# Patient Record
Sex: MALE | Race: WHITE | URBAN - METROPOLITAN AREA
[De-identification: names, ages, dates, MRNs, and addresses within clinical notes are randomized per-mention and may not be internally consistent; named-entity substitution may affect disease eponyms.]

---

## 2024-08-15 ENCOUNTER — OFFICE VISIT (OUTPATIENT)
Dept: RHEUMATOLOGY | Facility: CLINIC | Age: 49
End: 2024-08-15
Payer: COMMERCIAL

## 2024-08-15 VITALS
HEIGHT: 68 IN | TEMPERATURE: 98 F | OXYGEN SATURATION: 97 % | SYSTOLIC BLOOD PRESSURE: 118 MMHG | BODY MASS INDEX: 31.52 KG/M2 | WEIGHT: 208 LBS | HEART RATE: 66 BPM | RESPIRATION RATE: 16 BRPM | DIASTOLIC BLOOD PRESSURE: 78 MMHG

## 2024-08-15 DIAGNOSIS — D84.821 IMMUNODEFICIENCY DUE TO DRUG THERAPY (HCC): ICD-10-CM

## 2024-08-15 DIAGNOSIS — M35.3 PMR (POLYMYALGIA RHEUMATICA) (HCC): Primary | ICD-10-CM

## 2024-08-15 DIAGNOSIS — Z79.52 LONG TERM CURRENT USE OF SYSTEMIC STEROIDS: ICD-10-CM

## 2024-08-15 DIAGNOSIS — Z79.899 IMMUNODEFICIENCY DUE TO DRUG THERAPY (HCC): ICD-10-CM

## 2024-08-15 PROBLEM — E66.9 OBESITY DUE TO ENERGY IMBALANCE: Status: ACTIVE | Noted: 2021-03-15

## 2024-08-15 PROBLEM — K64.8 INTERNAL HEMORRHOIDS: Status: ACTIVE | Noted: 2020-12-29

## 2024-08-15 PROBLEM — M20.21 HALLUX RIGIDUS OF BOTH FEET: Status: ACTIVE | Noted: 2021-03-15

## 2024-08-15 PROBLEM — M20.22 HALLUX RIGIDUS OF BOTH FEET: Status: ACTIVE | Noted: 2021-03-15

## 2024-08-15 PROBLEM — K57.20 DIVERTICULITIS OF LARGE INTESTINE WITH ABSCESS WITHOUT BLEEDING: Status: ACTIVE | Noted: 2023-01-11

## 2024-08-15 PROBLEM — M79.643 PAIN IN UNSPECIFIED HAND: Status: ACTIVE | Noted: 2023-11-03

## 2024-08-15 PROBLEM — M25.879: Status: ACTIVE | Noted: 2021-03-15

## 2024-08-15 RX ORDER — LEFLUNOMIDE 20 MG/1
TABLET ORAL
Qty: 83 TABLET | Refills: 0 | Status: SHIPPED | OUTPATIENT
Start: 2024-08-15 | End: 2024-11-13

## 2024-08-15 NOTE — PROGRESS NOTES
Rheumatology New Patient Note  =====================================================================================================    Date of visit: 8/15/2024  ?  Chief complaint: PMR    Chief Complaint   Patient presents with    [facility]     New pt. Has seen a rheum for about a year and just wants to confirm diagnosis and treatment plan. Diagnosed with PMR. Tried methotrexate and had issues with liver. Trying to lower pred dosage and getting more joint pain. No headaches or blurry. Converted rapid score of 6.3     Referring (will send letter)  PCP  Anton Dennis MD  Fax: [phone]  Phone: [phone]  =====================================================================================================  HPI    Tom Farnsworth is a 49 year old male     Here for further evaluation and a second opinion of polymyalgia rheumatica.  Currently seeing Dr. Felipe Urbina numerous prescriber.  Hyperacute onset of bilateral shoulder weakness and stiffness in fall 2023.  Felt very weak.  Cannot raise his hands or stand up from a seated position.  Saw multiple orthopedic providers without clear diagnosis outside of osteoarthritis.    [facility] treated with prednisone 20 mg daily with significant improvement. Went back to normal self. Could not taper off prednisone, started on methotrexate for steroid sparing.  Lowest dose of prednisone achieved with 7 mg daily.  But developed slight elevation in ALT 39. When attempting to taper off prednisone, symptoms started to reoccur. Ok at 10 mg daily. But more symptoms on prednisone 7 mg daily.  Works in construction. S/p shoulder cortisone injection.   -Continues to have significant AM stiffness, for several hours.    No giant cell arteritis symptoms: Denies headaches, tenderness jaw claudication, vision changes (diplopia, amaurosis fugax symptoms, visual field changes), fevers, chills, night sweats.      14 point ROS negative except noted  above    Medications:  No current outpatient medications on file prior to visit.     No current facility-administered medications on file prior to visit.       Past Medical History:  Past Medical History:    History of diverticulitis    PMR (polymyalgia rheumatica) (HCC)     Past Surgical History:  Past Surgical History:   Procedure Laterality Date    Other surgical history Left 1995    shoulder surgery    Other surgical history      knee surgery     Family History:  History reviewed. No pertinent family history.  Social History:  Social History     Tobacco Use    Smoking status: Never    Smokeless tobacco: Never   Substance and Sexual Activity    Alcohol use: Yes     Comment: very rarely    Drug use: Never     ?  Allergies:  No Known Allergies      Objective    Vitals:    08/15/24 1306   BP: 118/78   Pulse: 66   Resp: 16   Temp: 98.2 °F (36.8 °C)   SpO2: 97%   Weight: 208 lb (94.3 kg)   Height: 5' 8\" (1.727 m)       GEN: NAD, well-nourished.   HEENT: Head: NCAT. Face: No lesions. Eyes: Conjunctiva clear. Sclera are anicteric. PERRLA. EOMs are full. Ears: The right and left ear canals are clear.  Nose: No external or internal nasal deformities. Nasal septum is midline. Mouth: The lips are within normal limits.  No oral ulcers Tongue is midline with no lesions. The oral cavity is clear.  -No temporal artery tenderness    Neck: Supple. No neck masses. No thyromegaly. No LAD, parotid or submandicular gland palpated.   CV: RRR, no mrg, S1/S2  PULM: CTAB, no wrr, easy effort  Extremities: No cyanosis, edema or deformities.   Neurologic: Strength, CN2-12 grossly intact   Psych: normal affect.   Skin: No lesions or rashes.  MSK: 28 joint count performed. No evidence of synovitis in mcp, pip, dip, wrist, elbows, shoulders, hips, knees, ankles, mtp unless otherwise noted. Full ROM of elbows, wrists, knees.     No painful arc  -Able to stand from seated position without use of hands    Labs:    6/20/2024  Creat 0.76, rest of  comprehensive metabolic panel (CMP) wnl  Esr 28  Complete blood count with differential (CBC w/ diff) wnl     2/2024  HBsAg, HBcAB total negative, HCV neg, IGRA neg  BETH, RF, CCP neg  Uric acid 4.2    10/2023  Sed rate 55      No results found for: \"WBC\", \"RBC\", \"HGB\", \"HCT\", \"PLT\", \"MPV\", \"MCV\", \"MCH\", \"MCHC\", \"RDW\", \"NEPRELIM\", \"NEUTABS\", \"LYMPHABS\", \"EOSABS\", \"BASABS\", \"NEUT\", \"LYMPH\", \"MON\", \"EOS\", \"BASO\", \"NEPERCENT\", \"LYPERCENT\", \"MOPERCENT\", \"EOPERCENT\", \"BAPERCENT\", \"NE\", \"LYMABS\", \"MOABSO\", \"EOABSO\", \"BAABSO\"  No results found for: \"GLU\", \"BUN\", \"BUNCREA\", \"CREATSERUM\", \"ANIONGAP\", \"GFR\", \"GFRNAA\", \"GFRAA\", \"CA\", \"OSMOCALC\", \"ALKPHO\", \"AST\", \"ALT\", \"ALKPHOS\", \"BILT\", \"TP\", \"ALB\", \"GLOBULIN\", \"AGRATIO\", \"NA\", \"K\", \"CL\", \"CO2\"      No results found for: \"ANATI\", \"BETH\", \"ANAS\", \"ANASCRN\", \"ANASCRNRFLX\", \"KIMANI\"  No results found for: \"SSA\", \"SSAUR\", \"ANTISSA\", \"SSA52\", \"SSA60\", \"SSADD\", \"SSB\", \"ANTISSB\"  No results found for: \"DSDNA\", \"ANTIDSDNA\", \"SMUD\", \"ANTISM\", \"SM\", \"RNP\", \"ANTIRNP\", \"SMITHRNP\"  No results found for: \"SCL70\", \"SCL\", \"JFTPSIY42\"  No results found for: \"C3\", \"C4\"  No results found for: \"DRVVT\", \"LAINT\", \"PTTLUPUS\", \"LUPUSINTERP\", \"LA\", \"K7UN9NWBEP\", \"E3XD5VZIMC\", \"Q8MTKCLWQY\", \"C0EEBXEIQV\"  No results found for: \"CARDIOLIPIGG\", \"CARDIOLIPIGM\", \"CARDIOLIPIGA\", \"CARDIOIGA\", \"CARLIP\"      Additional Labs:    Radiology:    Radiology review:      =====================================================================================================  Assessment and Plan    Assessment:  1. PMR (polymyalgia rheumatica) ([facility])    2. 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 Long term current use of systemic steroids      Agree with previous diagnosis of polymyalgia rheumatica.  Patient developed hyperacute onset of shoulder/hip girdle symptoms with an elevated sed rate of 55 (no CRP completed at that time).  Profound resolution of symptoms with moderate doses of prednisone with recurrence of  symptoms with tapering prednisone.  Currently at 7 mg daily and he is tapering by 1 mg every 2 weeks.  -Denies any symptoms current or past consistent with cranial GCA  -ALT elevation on methotrexate.  Patient declines restarting methotrexate  -Today patient is doing quite well.  In low disease activity currently in the early afternoon, though he does have prominent a.m. stiffness.    #Chronic steroid use:    High risk medication labs including CMP and CBC w/ diff reviewed from 6/2024. Results are stable.   -HBsAg, HBcAB total negative, HCV neg, IGRA neg in 2/2024    ?  Plan:  -Obtain additional monitoring blood work, repeat inflammatory markers given PMR.  Obtain muscle biomarkers including CK/LDH to evaluate for other systemic autoimmune disease such as myositis.  Also add on a myositis panel.  Add on a SPEP  -For steroid sparing, trial of leflunomide 10 mg daily for 2 weeks, then increase to 20 mg daily if tolerating  -Patient with some worsening symptoms over time with decreasing prednisone at the current rate, try decreasing little bit slower, by 1 mg every 3 weeks  -DEXA given chronic steroid use    Continue to follow-up with primary rheumatologist, Dr. Felipe Urbina    ARAVA/Leflunomide:  Risks of arava include and not limited to infections,  hairloss, Gastrointestinal, liver, hematological (blood) and pulmonary toxicity.  The most common side effect of leflunomide is diarrhea, which occurs in approximately 20% of patients. This symptom frequently improves with time or medications given to prevent diarrhea.   arava will stay in hepatobiliary system and will need to hold for 2 years prior to conception given teratogenicity.   Less common side effects include nausea, stomach pain, indigestion, rash, or hair loss. In fewer than 10% of patients, leflunomide can cause abnormal liver function tests or decreased blood cell or platelet counts. Rarely, this drug may cause lung problems such as cough, shortness of  breath or lung injury.There is also an increase risk of teratogenicity (EXTREMELY harmful to developing fetus). If patient wishes to have children, it is advised that the medication be discontinued at least 6 months prior to conception.    Due to liver toxicity, recommend to avoid alcohol while taking this medication.      If the patient has an infection, arava should be stopped and restarted after the infection has resolved. This should be done in consultation with the treating physician.       It is also advised to stop the medication prior to surgical procedures. This should also be discussed and coordinated with the treating provider and surgical team.     Laboratory work (blood work) to evaluate kidneys, blood counts and liver will be obtain every 3-4 weeks while the medication dose is increased.  After the medication reaches a stable dose, the frequency of lab monitoring can be decreased if there are no side effects.   The medication may take up 3 months to take full effect.      Diagnoses and all orders for this visit:    PMR (polymyalgia rheumatica) ([facility])  -     XR DEXA BONE DENSITOMETRY (CPT=77080); Future  -     CK (Creatine Kinase) (Not Creatinine); Future  -     LDH; Future  -     Monoclonal Protein Study; Future  -     Sed Rate, Westergren (Automated); Future  -     C-Reactive Protein; Future  -     Myositis Antibody Comprehensive Panel; Future  -     leflunomide 20 MG Oral Tab; Take 0.5 tablets (10 mg total) by mouth daily for 14 days, THEN 1 tablet (20 mg total) daily.    Immunodeficiency due to drug therapy ([facility])  -     XR DEXA BONE DENSITOMETRY (CPT=77080); Future  -     CK (Creatine Kinase) (Not Creatinine); Future  -     LDH; Future  -     Monoclonal Protein Study; Future  -     Sed Rate, Westergren (Automated); Future  -     C-Reactive Protein; Future  -     Myositis Antibody Comprehensive Panel; Future  -     leflunomide 20 MG Oral Tab; Take 0.5 tablets (10 mg total) by mouth daily for 14 days,  THEN 1 tablet (20 mg total) daily.    Long term current use of systemic steroids  -     XR DEXA BONE DENSITOMETRY (CPT=77080); Future  -     CK (Creatine Kinase) (Not Creatinine); Future  -     LDH; Future  -     Monoclonal Protein Study; Future  -     Sed Rate, Westergren (Automated); Future  -     C-Reactive Protein; Future  -     Myositis Antibody Comprehensive Panel; Future  -     leflunomide 20 MG Oral Tab; Take 0.5 tablets (10 mg total) by mouth daily for 14 days, THEN 1 tablet (20 mg total) daily.        No follow-ups on file.      The above plan of care, diagnosis, orders, and follow-up were discussed with the patient. Questions related to this recommended plan of care were answered.    Thank you for referring this delightful patient to me. Please feel free to contact me with any questions.     This report was performed utilizing speech recognition software technology. Despite proofreading, speech recognition errors could escape detection. If a word or phrase is confusing or out of context, please do not hesitate to call for   clarification.       Kind regards      Deb Menard MD  [facility]